# Patient Record
Sex: FEMALE | HISPANIC OR LATINO | Employment: UNEMPLOYED | URBAN - METROPOLITAN AREA
[De-identification: names, ages, dates, MRNs, and addresses within clinical notes are randomized per-mention and may not be internally consistent; named-entity substitution may affect disease eponyms.]

---
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
## 2024-02-25 ENCOUNTER — APPOINTMENT (OUTPATIENT)
Dept: GENERAL RADIOLOGY | Facility: CLINIC | Age: 19
End: 2024-02-25
Attending: EMERGENCY MEDICINE
Payer: COMMERCIAL

## 2024-02-25 ENCOUNTER — HOSPITAL ENCOUNTER (EMERGENCY)
Facility: CLINIC | Age: 19
Discharge: HOME | End: 2024-02-25
Attending: EMERGENCY MEDICINE | Admitting: EMERGENCY MEDICINE
Payer: COMMERCIAL

## 2024-02-25 VITALS
HEART RATE: 67 BPM | DIASTOLIC BLOOD PRESSURE: 64 MMHG | OXYGEN SATURATION: 99 % | SYSTOLIC BLOOD PRESSURE: 102 MMHG | TEMPERATURE: 98.2 F | RESPIRATION RATE: 16 BRPM

## 2024-02-25 DIAGNOSIS — M94.0 COSTOCHONDRITIS: ICD-10-CM

## 2024-02-25 LAB
ALBUMIN SERPL BCG-MCNC: 4.6 G/DL (ref 3.5–5.2)
ALP SERPL-CCNC: 109 U/L (ref 40–150)
ALT SERPL W P-5'-P-CCNC: 13 U/L (ref 0–50)
ANION GAP SERPL CALCULATED.3IONS-SCNC: 10 MMOL/L (ref 7–15)
AST SERPL W P-5'-P-CCNC: 18 U/L (ref 0–45)
BASOPHILS # BLD AUTO: 0.1 10E3/UL (ref 0–0.2)
BASOPHILS NFR BLD AUTO: 1 %
BILIRUB SERPL-MCNC: 0.3 MG/DL
BUN SERPL-MCNC: 9.9 MG/DL (ref 6–20)
CALCIUM SERPL-MCNC: 9.1 MG/DL (ref 8.6–10)
CHLORIDE SERPL-SCNC: 102 MMOL/L (ref 98–107)
CREAT SERPL-MCNC: 0.58 MG/DL (ref 0.51–0.95)
D DIMER PPP FEU-MCNC: <0.27 UG/ML FEU (ref 0–0.5)
DEPRECATED HCO3 PLAS-SCNC: 25 MMOL/L (ref 22–29)
EGFRCR SERPLBLD CKD-EPI 2021: >90 ML/MIN/1.73M2
EOSINOPHIL # BLD AUTO: 0.2 10E3/UL (ref 0–0.7)
EOSINOPHIL NFR BLD AUTO: 2 %
ERYTHROCYTE [DISTWIDTH] IN BLOOD BY AUTOMATED COUNT: 18 % (ref 10–15)
FLUAV RNA SPEC QL NAA+PROBE: NEGATIVE
FLUBV RNA RESP QL NAA+PROBE: NEGATIVE
GLUCOSE SERPL-MCNC: 91 MG/DL (ref 70–99)
HCT VFR BLD AUTO: 36 % (ref 35–47)
HGB BLD-MCNC: 11.2 G/DL (ref 11.7–15.7)
HOLD SPECIMEN: NORMAL
IMM GRANULOCYTES # BLD: 0 10E3/UL
IMM GRANULOCYTES NFR BLD: 0 %
LYMPHOCYTES # BLD AUTO: 1.8 10E3/UL (ref 0.8–5.3)
LYMPHOCYTES NFR BLD AUTO: 17 %
MCH RBC QN AUTO: 23.1 PG (ref 26.5–33)
MCHC RBC AUTO-ENTMCNC: 31.1 G/DL (ref 31.5–36.5)
MCV RBC AUTO: 74 FL (ref 78–100)
MONOCYTES # BLD AUTO: 0.9 10E3/UL (ref 0–1.3)
MONOCYTES NFR BLD AUTO: 9 %
NEUTROPHILS # BLD AUTO: 7.5 10E3/UL (ref 1.6–8.3)
NEUTROPHILS NFR BLD AUTO: 71 %
NRBC # BLD AUTO: 0 10E3/UL
NRBC BLD AUTO-RTO: 0 /100
PLATELET # BLD AUTO: 319 10E3/UL (ref 150–450)
POTASSIUM SERPL-SCNC: 3.7 MMOL/L (ref 3.4–5.3)
PROT SERPL-MCNC: 7.7 G/DL (ref 6.4–8.3)
RBC # BLD AUTO: 4.84 10E6/UL (ref 3.8–5.2)
RSV RNA SPEC NAA+PROBE: NEGATIVE
SARS-COV-2 RNA RESP QL NAA+PROBE: NEGATIVE
SODIUM SERPL-SCNC: 137 MMOL/L (ref 135–145)
TROPONIN T SERPL HS-MCNC: <6 NG/L
WBC # BLD AUTO: 10.5 10E3/UL (ref 4–11)

## 2024-02-25 PROCEDURE — 85379 FIBRIN DEGRADATION QUANT: CPT | Performed by: EMERGENCY MEDICINE

## 2024-02-25 PROCEDURE — 71046 X-RAY EXAM CHEST 2 VIEWS: CPT

## 2024-02-25 PROCEDURE — 36415 COLL VENOUS BLD VENIPUNCTURE: CPT | Performed by: EMERGENCY MEDICINE

## 2024-02-25 PROCEDURE — 85025 COMPLETE CBC W/AUTO DIFF WBC: CPT | Performed by: EMERGENCY MEDICINE

## 2024-02-25 PROCEDURE — 99285 EMERGENCY DEPT VISIT HI MDM: CPT | Mod: 25 | Performed by: EMERGENCY MEDICINE

## 2024-02-25 PROCEDURE — 93005 ELECTROCARDIOGRAM TRACING: CPT | Performed by: EMERGENCY MEDICINE

## 2024-02-25 PROCEDURE — 93010 ELECTROCARDIOGRAM REPORT: CPT | Performed by: EMERGENCY MEDICINE

## 2024-02-25 PROCEDURE — 250N000013 HC RX MED GY IP 250 OP 250 PS 637: Performed by: EMERGENCY MEDICINE

## 2024-02-25 PROCEDURE — 87637 SARSCOV2&INF A&B&RSV AMP PRB: CPT | Performed by: EMERGENCY MEDICINE

## 2024-02-25 PROCEDURE — 84484 ASSAY OF TROPONIN QUANT: CPT | Performed by: EMERGENCY MEDICINE

## 2024-02-25 PROCEDURE — 80053 COMPREHEN METABOLIC PANEL: CPT | Performed by: EMERGENCY MEDICINE

## 2024-02-25 RX ORDER — IBUPROFEN 600 MG/1
600 TABLET, FILM COATED ORAL ONCE
Status: COMPLETED | OUTPATIENT
Start: 2024-02-25 | End: 2024-02-25

## 2024-02-25 RX ORDER — ACETAMINOPHEN 500 MG
1000 TABLET ORAL ONCE
Status: COMPLETED | OUTPATIENT
Start: 2024-02-25 | End: 2024-02-25

## 2024-02-25 RX ADMIN — IBUPROFEN 600 MG: 600 TABLET, FILM COATED ORAL at 16:46

## 2024-02-25 RX ADMIN — ACETAMINOPHEN 1000 MG: 500 TABLET ORAL at 16:46

## 2024-02-25 ASSESSMENT — ACTIVITIES OF DAILY LIVING (ADL)
ADLS_ACUITY_SCORE: 35

## 2024-02-25 ASSESSMENT — COLUMBIA-SUICIDE SEVERITY RATING SCALE - C-SSRS
1. IN THE PAST MONTH, HAVE YOU WISHED YOU WERE DEAD OR WISHED YOU COULD GO TO SLEEP AND NOT WAKE UP?: NO
2. HAVE YOU ACTUALLY HAD ANY THOUGHTS OF KILLING YOURSELF IN THE PAST MONTH?: NO
6. HAVE YOU EVER DONE ANYTHING, STARTED TO DO ANYTHING, OR PREPARED TO DO ANYTHING TO END YOUR LIFE?: NO

## 2024-02-25 NOTE — ED PROVIDER NOTES
ED Provider Note  [facility]      History     Chief Complaint   Patient presents with    Chest Wall Pain     HPI  Marcia Keller is a 18 year old female who presents to the emergency department seeking evaluation of a cough and chest pain that have been ongoing for the past week. The chest pain is localized in the center of her chest and radiates outward bilaterally. Worse with inspiration and coughing.  She also reports experiencing a mild headache, nasal congestion, and sore throat. She denies any fevers, chills, nausea, vomiting, diarrhea, shortness of breath, abdominal pain, back pain, urinary symptoms, leg swelling, or ear pain. Her chest pain is better when resting or laying back. Her cough is productive of phlegm and tiny streaks of blood. She is not anticoagulated. She is not on any sort of birth control or estrogen. She denies any recent surgeries. She denies any recent prolonged immobilizations. She has no history of deep vein thrombosis. She has no history of hemoptysis. She has been managing her symptoms with Tylenol and Ibuprofen.       Past Medical History  No past medical history on file.  No past surgical history on file.  ondansetron (ZOFRAN-ODT) 4 MG ODT tab      No Known Allergies  Family History  No family history on file.  Social History   Social History     Tobacco Use    Smoking status: Never    Smokeless tobacco: Never      Past medical history, past surgical history, medications, allergies, family history, and social history were reviewed with the patient. No additional pertinent items.      A medically appropriate review of systems was performed with pertinent positives and negatives noted in the HPI, and all other systems negative.    Physical Exam   BP: 110/76  Pulse: 102  Temp: 98.2  F (36.8  C)  Resp: 18  SpO2: 97 %  Physical Exam  General: no acute distress.    HENT: Normocephalic and atraumatic. Trachea midline.  Normal oropharynx.  Normal  Tms.  Eyes: EOMI, conjunctivae normal.  Normal voice.  Cardiovascular:  Normal rate and regular rhythm.   No murmur heard.  Radial pulses 2+ bilaterally.  Pulmonary:  No respiratory distress. Normal breath sounds bilaterally.  Abdominal: no distension.  Abdomen is soft. There is no mass. There is no abdominal tenderness.  Musculoskeletal:    Moving all extremities spontaneously.  No edema.  Negative indu sign. Reproducible lower chest wall tenderness bilaterally along rib cage.  No crepitus.  Skin: Warm, dry, and well perfused. Good turgor.  Neurological:  No focal deficit present.    Psychiatric:   The patient is awake, alert.  Appropriate mood and affect.    ED Course, Procedures, & Data      Procedures            EKG Interpretation:      Interpreted by Marleen Sutherland MD  Time reviewed: 330  Symptoms at time of EKG: chest pain   Rhythm: normal sinus   Rate: normal  Axis: normal  Ectopy: none  Conduction: normal  ST Segments/ T Waves: nonspecific t wave changes  Q Waves: none  Comparison to prior: No old EKG available    Clinical Impression: normal sinus with T wave inversions of V1, V2, III                 Results for orders placed or performed during the hospital encounter of 02/25/24   [place] Draw     Status: None ()    Narrative    The following orders were created for panel order [place] Draw.  Procedure                               Abnormality         Status                     ---------                               -----------         ------                     Extra Blue Top Tube[[id number]]                                                         Extra Red Top Tube[[id number]]                                                          Extra Green Top (Lithium...[[id number]]                                                 Extra Purple Top Tube[[id number]]                                                         Please view results for these tests on the individual orders.   EKG 12 lead     Status: None  (Preliminary result)   Result Value Ref Range    Systolic Blood Pressure  mmHg    Diastolic Blood Pressure  mmHg    Ventricular Rate 83 BPM    Atrial Rate 83 BPM    TX Interval 116 ms    QRS Duration 84 ms     ms    QTc 427 ms    P Axis 54 degrees    R AXIS 83 degrees    T Axis 8 degrees    Interpretation ECG Sinus rhythm  Normal ECG        Medications - No data to display  Labs Ordered and Resulted from Time of ED Arrival to Time of ED Departure - No data to display  No orders to display          Critical care was not performed.     Medical Decision Making  The patient's presentation was of high complexity (an acute health issue posing potential threat to life or bodily function).     The patient's evaluation involved:  review of external note(s) from 3+ sources (see separate area of note for details)  review of 3+ test result(s) ordered prior to this encounter (see separate area of note for details)  ordering and/or review of 3+ test(s) in this encounter (see separate area of note for details)  independent interpretation of testing performed by another health professional (see separate area of note for details)     The patient's management necessitated low risk treatment    Assessment & Plan    Patient presented for complaint of chest wall pain and URI symptoms.  HR is 102 bpm, patient is afebrile and in no acute distress. She has reproducible lower ribcage tenderness.  Differential includes viral syndrome, bronchitis, pulmonary embolism, pneumonia, pneumothorax, arrhythmia, pleural effusion, myocarditis.    ECG with nonspecific twave changes.  Bloodwork with mild microcytic anemia Hgb 11.2.  D-dimer is not elevated.  Troponin is not elevated.  COVID, RSV, influenza PCR negative.  Chest xray clear.  Patient felt improved with tylenol and ibuprofen.  Discussed viral syndrome and supportive care at home.  Discussed anemia and starting iron supplement in conjunction with PCP follow-up.  Discharged with return  precautions.    I have reviewed the nursing notes. I have reviewed the findings, diagnosis, plan and need for follow up with the patient.    New Prescriptions    No medications on file       Final diagnoses:   None     [facility] EMERGENCY DEPARTMENT  2/25/2024     Marleen Sutherland MD  02/27/24 0147

## 2024-02-25 NOTE — ED NOTES
Patient reports having cough for about a week, chest pain started today, unsure if this related to coughing or not

## 2024-02-26 LAB
ATRIAL RATE - MUSE: 83 BPM
DIASTOLIC BLOOD PRESSURE - MUSE: NORMAL MMHG
INTERPRETATION ECG - MUSE: NORMAL
P AXIS - MUSE: 54 DEGREES
PR INTERVAL - MUSE: 116 MS
QRS DURATION - MUSE: 84 MS
QT - MUSE: 364 MS
QTC - MUSE: 427 MS
R AXIS - MUSE: 83 DEGREES
SYSTOLIC BLOOD PRESSURE - MUSE: NORMAL MMHG
T AXIS - MUSE: 8 DEGREES
VENTRICULAR RATE- MUSE: 83 BPM

## 2024-02-26 NOTE — DISCHARGE INSTRUCTIONS
Your workup was very reassuring today.  Negative for bacterial illness, blood clot or popped lung.        It is likely that you does have chest wall pain from coughing.  Take Tylenol and ibuprofen as directed for musculoskeletal pain.  Your symptoms should improve throughout the week.  Return to the emergency department if you are unable to breathe, loss of consciousness, or if you have concerns.

## 2025-07-29 ENCOUNTER — HOSPITAL ENCOUNTER (EMERGENCY)
Facility: CLINIC | Age: 20
Discharge: HOME | End: 2025-07-29
Attending: FAMILY MEDICINE
Payer: COMMERCIAL

## 2025-07-29 ENCOUNTER — APPOINTMENT (OUTPATIENT)
Dept: GENERAL RADIOLOGY | Facility: CLINIC | Age: 20
End: 2025-07-29
Attending: STUDENT IN AN ORGANIZED HEALTH CARE EDUCATION/TRAINING PROGRAM
Payer: COMMERCIAL

## 2025-07-29 VITALS
OXYGEN SATURATION: 98 % | WEIGHT: 100 LBS | BODY MASS INDEX: 18.88 KG/M2 | DIASTOLIC BLOOD PRESSURE: 74 MMHG | SYSTOLIC BLOOD PRESSURE: 113 MMHG | RESPIRATION RATE: 16 BRPM | HEIGHT: 61 IN | HEART RATE: 97 BPM | TEMPERATURE: 98.6 F

## 2025-07-29 DIAGNOSIS — M25.571 ACUTE RIGHT ANKLE PAIN: Primary | ICD-10-CM

## 2025-07-29 PROCEDURE — 99283 EMERGENCY DEPT VISIT LOW MDM: CPT | Performed by: FAMILY MEDICINE

## 2025-07-29 PROCEDURE — 250N000013 HC RX MED GY IP 250 OP 250 PS 637: Performed by: STUDENT IN AN ORGANIZED HEALTH CARE EDUCATION/TRAINING PROGRAM

## 2025-07-29 PROCEDURE — 99284 EMERGENCY DEPT VISIT MOD MDM: CPT | Mod: FS | Performed by: FAMILY MEDICINE

## 2025-07-29 PROCEDURE — 73610 X-RAY EXAM OF ANKLE: CPT | Mod: RT

## 2025-07-29 RX ORDER — ACETAMINOPHEN 500 MG
1000 TABLET ORAL ONCE
Status: COMPLETED | OUTPATIENT
Start: 2025-07-29 | End: 2025-07-29

## 2025-07-29 RX ADMIN — ACETAMINOPHEN 1000 MG: 500 TABLET ORAL at 19:19

## 2025-07-29 ASSESSMENT — COLUMBIA-SUICIDE SEVERITY RATING SCALE - C-SSRS
2. HAVE YOU ACTUALLY HAD ANY THOUGHTS OF KILLING YOURSELF IN THE PAST MONTH?: NO
6. HAVE YOU EVER DONE ANYTHING, STARTED TO DO ANYTHING, OR PREPARED TO DO ANYTHING TO END YOUR LIFE?: NO
1. IN THE PAST MONTH, HAVE YOU WISHED YOU WERE DEAD OR WISHED YOU COULD GO TO SLEEP AND NOT WAKE UP?: NO

## 2025-07-29 ASSESSMENT — ACTIVITIES OF DAILY LIVING (ADL)
ADLS_ACUITY_SCORE: 41
ADLS_ACUITY_SCORE: 41

## 2025-07-29 NOTE — ED PROVIDER NOTES
"    [facility] EMERGENCY DEPARTMENT ([facility])    7/29/25            History     Chief Complaint   Patient presents with    Joint Swelling     Pt reports was taking the trash out and stepped on an hard object. The object hit the right inner ankle. Incident took place today around. Pt took 400mg of   ibuprofen. R ankle pain 8/10. The swelling noted 10-15 min after the incident.      The history is provided by the patient and medical records. No  was used.     Marcia Keller is an otherwise healthy 19 year old female who presents to the emergency department for evaluation of right ankle pain. She reports around 2:30 this afternoon, 4 hours prior to evaluation, she was doing yard work and taking out trash when she had a sharp pain to her right inner ankle. When she inspected her ankle, she noticed that there was a small area of bleeding. She is not sure how this occurred or if something hit her. Since then, she has had pain and swelling to the right inner ankle including with ambulation despite taking 400 mg ibuprofen. She reports prior to this afternoon, she had no pain and was otherwise doing well. No additional concerns today.    Past Medical History  No past medical history on file.  No past surgical history on file.  ondansetron (ZOFRAN-ODT) 4 MG ODT tab      No Known Allergies  Family History  No family history on file.  Social History   Social History     Tobacco Use    Smoking status: Never    Smokeless tobacco: Never      Past medical history, past surgical history, medications, allergies, family history, and social history were reviewed with the patient. No additional pertinent items.   A complete review of systems was performed with pertinent positives and negatives noted in the HPI, and all other systems negative.    Physical Exam   BP: 113/74  Pulse: 97  Temp: 98.6  F (37  C)  Resp: 16  Height: 154.9 cm (5' 1\")  Weight: 45.4 kg (100 lb)  SpO2: 98 %  Physical " Exam  Vitals and nursing note reviewed.   Constitutional:       General: She is not in acute distress.     Appearance: Normal appearance. She is not ill-appearing, toxic-appearing or diaphoretic.      Comments: Awake, sitting up on edge of bed, answering questions appropriately   HENT:      Head: Normocephalic and atraumatic.      Mouth/Throat:      Mouth: Mucous membranes are moist.      Pharynx: Oropharynx is clear.   Eyes:      Extraocular Movements: Extraocular movements intact.      Pupils: Pupils are equal, round, and reactive to light.   Cardiovascular:      Rate and Rhythm: Normal rate and regular rhythm.   Pulmonary:      Effort: Pulmonary effort is normal. No respiratory distress.   Musculoskeletal:      Cervical back: Normal range of motion and neck supple. No rigidity or tenderness.      Comments: Small scabbed lesion to right ankle just superior to medial malleolus, no erythema, edema, warmth, or drainage, no active bleeding. Passive and active ROM intact at ankle, some discomfort with ankle flexion, DP and PT pulses 2+, cap refill <2 seconds   Lymphadenopathy:      Cervical: No cervical adenopathy.   Skin:     General: Skin is warm and dry.      Capillary Refill: Capillary refill takes less than 2 seconds.   Neurological:      General: No focal deficit present.      Mental Status: She is alert and oriented to person, place, and time.   Psychiatric:         Mood and Affect: Mood normal.         Behavior: Behavior normal.         Thought Content: Thought content normal.         Judgment: Judgment normal.           ED Course, Procedures, & Data      Procedures          Results for orders placed or performed during the hospital encounter of 07/29/25   Ankle XR, G/E 3 views, right    Impression    IMPRESSION:     Negative for acute right ankle fracture or dislocation. No radiodense foreign body. Normal hindfoot joint spacing.     Medications   acetaminophen (TYLENOL) tablet 1,000 mg (1,000 mg Oral $Given  7/29/25 1919)          Critical care was not performed.     Medical Decision Making  The patient's presentation was of moderate complexity (an undiagnosed new problem with uncertain prognosis).    The patient's evaluation involved:  ordering and/or review of 1 test(s) in this encounter (see separate area of note for details)  independent interpretation of testing performed by another health professional (see separate area of note for details)    The patient's management necessitated only low risk treatment.    Assessment & Plan    Marcia Keller is an otherwise healthy 19 year old female who presents to the emergency department for evaluation of right ankle pain after unknown injury earlier in the day with associated small amount of bleeding. On exam, vitals are in acceptable ranges.  She has a small scabbed lesion to the right ankle just superior to the medial malleolus, no associated erythema, edema, warmth, discharge, bleeding. ROM intact. Distal sensation and perfusion intact. Exam is otherwise benign. Reviewed differential diagnosis with patient. Do not suspect septic joint at this time given short amount of time since injury and no infectious symptoms. Could have been an insect bite or could have been hit by something on the ground. Ankle x-ray obtained to evaluate for fracture versus foreign body, images and radiology report reviewed, unremarkable. She was given a dose of Tylenol while in the ED and given ice to put on her ankle which she reports was helpful. Reviewed plan for follow-up with primary care in the next week if still having pain, ED return precautions discussed. Patient and mom were in understanding and agreement with plan and no additional questions. She was discharged home in stable and ambulatory condition.    I have reviewed the nursing notes. I have reviewed the findings, diagnosis, plan and need for follow up with the patient.    Discharge Medication List as of 7/29/2025  8:25 PM           Final diagnoses:   Acute right ankle pain       Eva Sullivan PA-C   [facility] EMERGENCY DEPARTMENT  7/29/2025     Eva Sullivan PA-C  07/30/25 1544

## 2025-07-30 NOTE — DISCHARGE INSTRUCTIONS
You were seen in the emergency room today for evaluation of pain to your right inner ankle after an injury with associated bleeding around 2:30 this afternoon.  The x-ray did not show any evidence of any fractures, dislocations, or any foreign bodies (something and then that should not be in the).  It is possible that you may have been stung by a bee or an insect today and that is contributing to your pain.  You can continue to take Tylenol and ibuprofen at home for any ongoing discomfort, ice the ankle as needed.  Please follow-up with your primary care provider in the next week for recheck of your ankle, and return to the emergency department with any new or worsening symptoms.